# Patient Record
Sex: MALE | Race: OTHER | NOT HISPANIC OR LATINO | URBAN - METROPOLITAN AREA
[De-identification: names, ages, dates, MRNs, and addresses within clinical notes are randomized per-mention and may not be internally consistent; named-entity substitution may affect disease eponyms.]

---

## 2024-06-02 ENCOUNTER — EMERGENCY (EMERGENCY)
Facility: HOSPITAL | Age: 24
LOS: 1 days | Discharge: HOME | End: 2024-06-02
Attending: STUDENT IN AN ORGANIZED HEALTH CARE EDUCATION/TRAINING PROGRAM
Payer: SELF-PAY

## 2024-06-02 VITALS
TEMPERATURE: 98 F | HEIGHT: 66 IN | HEART RATE: 75 BPM | DIASTOLIC BLOOD PRESSURE: 82 MMHG | SYSTOLIC BLOOD PRESSURE: 141 MMHG | RESPIRATION RATE: 19 BRPM | OXYGEN SATURATION: 100 % | WEIGHT: 175.05 LBS

## 2024-06-02 VITALS
OXYGEN SATURATION: 99 % | SYSTOLIC BLOOD PRESSURE: 130 MMHG | HEART RATE: 71 BPM | TEMPERATURE: 98 F | DIASTOLIC BLOOD PRESSURE: 77 MMHG | RESPIRATION RATE: 18 BRPM

## 2024-06-02 PROCEDURE — 36000 PLACE NEEDLE IN VEIN: CPT | Mod: XU

## 2024-06-02 PROCEDURE — 99053 MED SERV 10PM-8AM 24 HR FAC: CPT

## 2024-06-02 PROCEDURE — 12001 RPR S/N/AX/GEN/TRNK 2.5CM/<: CPT

## 2024-06-02 PROCEDURE — 99283 EMERGENCY DEPT VISIT LOW MDM: CPT | Mod: 25

## 2024-06-02 PROCEDURE — 99284 EMERGENCY DEPT VISIT MOD MDM: CPT | Mod: 25

## 2024-06-02 RX ORDER — LIDOCAINE HCL 20 MG/ML
20 VIAL (ML) INJECTION ONCE
Refills: 0 | Status: COMPLETED | OUTPATIENT
Start: 2024-06-02 | End: 2024-06-02

## 2024-06-02 RX ORDER — BACITRACIN ZINC 500 UNIT/G
1 OINTMENT IN PACKET (EA) TOPICAL ONCE
Refills: 0 | Status: DISCONTINUED | OUTPATIENT
Start: 2024-06-02 | End: 2024-06-05

## 2024-06-02 RX ADMIN — Medication 20 MILLILITER(S): at 03:05

## 2024-06-02 NOTE — ED PROVIDER NOTE - CLINICAL SUMMARY MEDICAL DECISION MAKING FREE TEXT BOX
Heena Lacy DO PGY-3  24 year old male with no PMH tdap up to date presents to the ER with L index finger laceration, was using clean new xacto knife to cut something when he sliced his finger. No active bleeding. Had 2 cm linear laceration through lateral nail, no involvement of cuticle. Neurovascularly intact at the finger with 5/5 strength flexion/extension/abduction/adduction, sensation intact, normally capillary refill. Will repair lac and dc with hand f/u.

## 2024-06-02 NOTE — ED PROVIDER NOTE - NSFOLLOWUPINSTRUCTIONS_ED_ALL_ED_FT
You were seen in the ER today for your finger laceration. We placed sutures. They will absorb on their own and do not need to be removed.    Please follow up with the hand specialist within 1 week: call to make an appointment.    Monitor for signs of infection: redness, swelling, pus, increased pain, or any other concerns.    Please follow up with your primary care doctor within 1 - 3 days. Call and let them know you were seen in the ER today.     Return to the ER for any worsening symptoms or concerns, including chest pain, shortness of breath, lightheadedness, weakness, or any other concerns.

## 2024-06-02 NOTE — ED PROVIDER NOTE - PHYSICAL EXAMINATION
Gen: Alert, ox3, NAD, well appearing, ambulatory.   HEENT: Atraumatic. Mucous membranes moist.  CV: RRR. No significant LE edema.   Resp: Unlabored-respirations. CTAB.  GI: Abdomen non tender to palpation, soft.  Skin/MSK: 2 cm linear laceration through lateral nail of L index finger, no involvement of cuticle. Sensation and ROM intact. Bleeding controlled.   Neuro: EOMI. Pupils ERRL. Following commands.   Psych: Appropriate mood, cooperative

## 2024-06-02 NOTE — ED PROCEDURE NOTE - ATTENDING CONTRIBUTION TO CARE
I, Joe Mina, was present for the supervision of the described procedure, and provided assistance in its completion as needed.

## 2024-06-02 NOTE — ED ADULT NURSE NOTE - OBJECTIVE STATEMENT
23 y/o M no pertinent PMH presented to ED via walk in triage c/o wound to left index finger from a knife, bleeding controlled at this time, full sensation in left upper extremity, able to move all fingers with some discomfort. Pt A&Ox4, respirations even and unlabored on room air, moving all extremities easily, is ambulatory with steady gait.

## 2024-06-02 NOTE — ED PROVIDER NOTE - CARE PROVIDER_API CALL
Julius Nichole  Plastic Surgery  [address], [address]  [place] [address]  Phone: [phone]  Fax: [phone]  Follow Up Time: 1-3 Days

## 2024-06-02 NOTE — ED PROVIDER NOTE - ATTENDING CONTRIBUTION TO CARE
I, Joe Mina, have performed a history and physical exam on this patient, and discussed their management with the resident. I have fully participated in the care of this patient. I agree with the history, physical exam, and plan as documented by the resident